# Patient Record
Sex: MALE | Race: WHITE | URBAN - METROPOLITAN AREA
[De-identification: names, ages, dates, MRNs, and addresses within clinical notes are randomized per-mention and may not be internally consistent; named-entity substitution may affect disease eponyms.]

---

## 2019-11-27 ENCOUNTER — OFFICE VISIT (OUTPATIENT)
Dept: FAMILY MEDICINE CLINIC | Age: 34
End: 2019-11-27

## 2019-11-27 VITALS
TEMPERATURE: 96.9 F | BODY MASS INDEX: 28.1 KG/M2 | HEIGHT: 73 IN | WEIGHT: 212 LBS | DIASTOLIC BLOOD PRESSURE: 82 MMHG | HEART RATE: 81 BPM | SYSTOLIC BLOOD PRESSURE: 142 MMHG

## 2019-11-27 DIAGNOSIS — M10.072 ACUTE IDIOPATHIC GOUT OF LEFT FOOT: Primary | ICD-10-CM

## 2019-11-27 RX ORDER — PREDNISONE 20 MG/1
20 TABLET ORAL
Qty: 20 TAB | Refills: 0 | Status: SHIPPED | OUTPATIENT
Start: 2019-11-27

## 2019-11-27 RX ORDER — INDOMETHACIN 25 MG/1
25 CAPSULE ORAL 3 TIMES DAILY
Qty: 90 CAP | Refills: 2 | Status: SHIPPED | OUTPATIENT
Start: 2019-11-27 | End: 2020-02-25

## 2019-11-27 RX ORDER — IBUPROFEN 200 MG
TABLET ORAL
COMMUNITY
End: 2019-11-27 | Stop reason: ALTCHOICE

## 2019-11-27 RX ORDER — PREDNISONE 10 MG/1
TABLET ORAL
COMMUNITY

## 2019-11-27 NOTE — PROGRESS NOTES
Discharge instructions reviewed with patient     Medication list and understanding of medications reviewed with patient. OTC and herbal medications reviewed and added to med list if applicable  Barriers to adherence assessed. AVS given to patient with review. Patient expressed understanding. Patient will return to clinic for lab work. Blood Pressure log also given to patient.

## 2019-11-30 NOTE — PROGRESS NOTES
HISTORY OF PRESENT ILLNESS  Amie Hazel is a 27 y.o. male here for gout flare-up x 1 day. HPI Mr. Fabiana Joe has had a few episodes of gout this year. He admits to consuming certain triggers that contribute to this plus he was dehydrated from a recent cold/flu. When this flare started in his left upper foot yesterday he took 20mg of prednisone from a friend which has been effective while at work but the pain is coming back. There is no redness or swelling to the foot, no open sores. He has been on cholchicine in the past that was also effective but it is expensive. He denies chronic illnesses and diabetes. Current everyday smoker. Review of Systems   Constitutional: Negative. HENT: Negative. Eyes: Negative. Respiratory: Negative. Cardiovascular: Negative. Gastrointestinal: Negative. Genitourinary: Negative. Musculoskeletal: Positive for joint pain. Negative for back pain, falls, myalgias and neck pain. Skin: Negative. Neurological: Negative. Endo/Heme/Allergies: Negative. Psychiatric/Behavioral: Negative. Physical Exam  Neurological:      Mental Status: He is alert and oriented x3  Left foot, dorsal without edema or redness, TTP, 2+pedal pulses  Cardiopulmonary- RRR, S1S2, no G/R/M; lungs CTAB, no wheezes        ASSESSMENT and PLAN    ICD-10-CM ICD-9-CM    1. Acute idiopathic gout of left foot M10.072 274.01    -Prednisone taper x5 days for current acute gout phase: 50mg day 1, 40mg day 2, 30mg day 3, 20mg day 4, 10mg day 5  -Indomethacin 25mg for prophylaxis 1-3x/day.  Do not take with Prednisone  -Discussed in detail of diet and triggers especially with upcoming holidays  -Exercise 5x/week  -Avoid alcohol intake  -Proper hydration discussed  -avoid constricting shoes  RTC if symptoms do not improve    Mr. Fabiana Joe agrees to plan

## 2019-11-30 NOTE — PATIENT INSTRUCTIONS
Gout: Care Instructions Your Care Instructions Gout is a form of arthritis caused by a buildup of uric acid crystals in a joint. It causes sudden attacks of pain, swelling, redness, and stiffness, usually in one joint, especially the big toe. Gout usually comes on without a cause. But it can be brought on by drinking alcohol (especially beer) or eating seafood and red meat. Taking certain medicines, such as diuretics or aspirin, also can bring on an attack of gout. Taking your medicines as prescribed and following up with your doctor regularly can help you avoid gout attacks in the future. Follow-up care is a key part of your treatment and safety. Be sure to make and go to all appointments, and call your doctor if you are having problems. It's also a good idea to know your test results and keep a list of the medicines you take. How can you care for yourself at home? · If the joint is swollen, put ice or a cold pack on the area for 10 to 20 minutes at a time. Put a thin cloth between the ice and your skin. · Prop up the sore limb on a pillow when you ice it or anytime you sit or lie down during the next 3 days. Try to keep it above the level of your heart. This will help reduce swelling. · Rest sore joints. Avoid activities that put weight or strain on the joints for a few days. Take short rest breaks from your regular activities during the day. · Take your medicines exactly as prescribed. Call your doctor if you think you are having a problem with your medicine. · Take pain medicines exactly as directed. ? If the doctor gave you a prescription medicine for pain, take it as prescribed. ? If you are not taking a prescription pain medicine, ask your doctor if you can take an over-the-counter medicine. · Eat less seafood and red meat. · Check with your doctor before drinking alcohol. · Losing weight, if you are overweight, may help reduce attacks of gout. But do not go on a [employer]. \" Losing a lot of weight in a short amount of time can cause a gout attack. When should you call for help? Call your doctor now or seek immediate medical care if: 
  · You have a fever.  
  · The joint is so painful you cannot use it.  
  · You have sudden, unexplained swelling, redness, warmth, or severe pain in one or more joints.  
 Watch closely for changes in your health, and be sure to contact your doctor if: 
  · You have joint pain.  
  · Your symptoms get worse or are not improving after 2 or 3 days. Where can you learn more? Go to http://annabel-stella.info/. Enter O191 in the search box to learn more about \"Gout: Care Instructions. \" Current as of: April 1, 2019 Content Version: 12.2 © 0792-7054 [employer]. Care instructions adapted under license by [employer] (which disclaims liability or warranty for this information). If you have questions about a medical condition or this instruction, always ask your healthcare professional. [address] any warranty or liability for your use of this information.